# Patient Record
Sex: MALE | Race: OTHER
[De-identification: names, ages, dates, MRNs, and addresses within clinical notes are randomized per-mention and may not be internally consistent; named-entity substitution may affect disease eponyms.]

---

## 2020-03-23 ENCOUNTER — HOSPITAL ENCOUNTER (EMERGENCY)
Dept: HOSPITAL 72 - EMR | Age: 55
Discharge: ANOTHER HOSPITAL | End: 2020-03-23
Payer: COMMERCIAL

## 2020-03-23 VITALS — HEIGHT: 65 IN | BODY MASS INDEX: 29.99 KG/M2 | WEIGHT: 180 LBS

## 2020-03-23 VITALS — SYSTOLIC BLOOD PRESSURE: 161 MMHG | DIASTOLIC BLOOD PRESSURE: 109 MMHG

## 2020-03-23 VITALS — DIASTOLIC BLOOD PRESSURE: 104 MMHG | SYSTOLIC BLOOD PRESSURE: 165 MMHG

## 2020-03-23 VITALS — DIASTOLIC BLOOD PRESSURE: 116 MMHG | SYSTOLIC BLOOD PRESSURE: 209 MMHG

## 2020-03-23 DIAGNOSIS — R07.9: ICD-10-CM

## 2020-03-23 DIAGNOSIS — I10: ICD-10-CM

## 2020-03-23 DIAGNOSIS — R00.0: ICD-10-CM

## 2020-03-23 DIAGNOSIS — I51.7: ICD-10-CM

## 2020-03-23 DIAGNOSIS — I21.3: Primary | ICD-10-CM

## 2020-03-23 LAB
ADD MANUAL DIFF: NO
ALBUMIN SERPL-MCNC: 4.4 G/DL (ref 3.4–5)
ALBUMIN/GLOB SERPL: 1.1 {RATIO} (ref 1–2.7)
ALP SERPL-CCNC: 101 U/L (ref 46–116)
ALT SERPL-CCNC: 52 U/L (ref 12–78)
ANION GAP SERPL CALC-SCNC: 15 MMOL/L (ref 5–15)
AST SERPL-CCNC: 40 U/L (ref 15–37)
BASOPHILS NFR BLD AUTO: 1 % (ref 0–2)
BILIRUB SERPL-MCNC: 0.4 MG/DL (ref 0.2–1)
BUN SERPL-MCNC: 8 MG/DL (ref 7–18)
CALCIUM SERPL-MCNC: 10 MG/DL (ref 8.5–10.1)
CHLORIDE SERPL-SCNC: 98 MMOL/L (ref 98–107)
CO2 SERPL-SCNC: 25 MMOL/L (ref 21–32)
CREAT SERPL-MCNC: 1 MG/DL (ref 0.55–1.3)
EOSINOPHIL NFR BLD AUTO: 0.1 % (ref 0–3)
ERYTHROCYTE [DISTWIDTH] IN BLOOD BY AUTOMATED COUNT: 11.1 % (ref 11.6–14.8)
GLOBULIN SER-MCNC: 4 G/DL
HCT VFR BLD CALC: 48.6 % (ref 42–52)
HGB BLD-MCNC: 17.4 G/DL (ref 14.2–18)
LYMPHOCYTES NFR BLD AUTO: 15.1 % (ref 20–45)
MCV RBC AUTO: 83 FL (ref 80–99)
MONOCYTES NFR BLD AUTO: 5.4 % (ref 1–10)
NEUTROPHILS NFR BLD AUTO: 78.4 % (ref 45–75)
PLATELET # BLD: 327 K/UL (ref 150–450)
POTASSIUM SERPL-SCNC: 3.5 MMOL/L (ref 3.5–5.1)
RBC # BLD AUTO: 5.84 M/UL (ref 4.7–6.1)
SODIUM SERPL-SCNC: 137 MMOL/L (ref 136–145)
WBC # BLD AUTO: 15 K/UL (ref 4.8–10.8)

## 2020-03-23 PROCEDURE — 85025 COMPLETE CBC W/AUTO DIFF WBC: CPT

## 2020-03-23 PROCEDURE — 71045 X-RAY EXAM CHEST 1 VIEW: CPT

## 2020-03-23 PROCEDURE — 83735 ASSAY OF MAGNESIUM: CPT

## 2020-03-23 PROCEDURE — 99284 EMERGENCY DEPT VISIT MOD MDM: CPT

## 2020-03-23 PROCEDURE — 36415 COLL VENOUS BLD VENIPUNCTURE: CPT

## 2020-03-23 PROCEDURE — 80307 DRUG TEST PRSMV CHEM ANLYZR: CPT

## 2020-03-23 PROCEDURE — 83880 ASSAY OF NATRIURETIC PEPTIDE: CPT

## 2020-03-23 PROCEDURE — 96375 TX/PRO/DX INJ NEW DRUG ADDON: CPT

## 2020-03-23 PROCEDURE — 84484 ASSAY OF TROPONIN QUANT: CPT

## 2020-03-23 PROCEDURE — 80053 COMPREHEN METABOLIC PANEL: CPT

## 2020-03-23 PROCEDURE — 93005 ELECTROCARDIOGRAM TRACING: CPT

## 2020-03-23 PROCEDURE — 96374 THER/PROPH/DIAG INJ IV PUSH: CPT

## 2020-03-23 NOTE — EMERGENCY ROOM REPORT
History of Present Illness


General


Chief Complaint:  Hypertension


Source:  Patient





Present Illness


HPI


Patient is a 54-year-old male who presents after increased left-sided chest 

pain onset last night.  He reports having no prior smoking history.  Denies any 

cough.  He had not been having any fever.  Reports having constant pain to the 

left side of his chest.  Does not radiate.  He denies any prior history of 

smoking.  Denies hypertension.  He did not take medications regularly.  

Occasionally drinks alcohol at parties but denies any regular alcohol use.


COVID-19 risk:Contact w/high r:  No


COVID-19 risk:Travel to affect:  No


Has patient experienced corona:  No


Allergies:  


Coded Allergies:  


     No Known Allergies (Unverified , 3/23/20)





Patient History


Past Medical History:  see triage record


Reviewed Nursing Documentation:  PMH: Agreed; PSxH: Agreed





Nursing Documentation-PMH


Past Medical History:  No Stated History





Review of Systems


All Other Systems:  negative except mentioned in HPI





Physical Exam





Vital Signs








  Date Time  Temp Pulse Resp B/P (MAP) Pulse Ox O2 Delivery O2 Flow Rate FiO2


 


3/23/20 05:59 98.4 114 18 209/116 (147) 95 Room Air  








Sp02 EP Interpretation:  reviewed, normal


General Appearance:  normal inspection, well appearing, no apparent distress, 

alert, GCS 15


Head:  atraumatic


ENT:  normal ENT inspection, hearing grossly normal, normal voice


Neck:  normal inspection, full range of motion, supple, no bony tend


Respiratory:  normal inspection, lungs clear, normal breath sounds, no 

respiratory distress, no retraction, no wheezing


Cardiovascular #1:  no edema, tachycardia


Gastrointestinal:  normal inspection, normal bowel sounds, non tender, soft, no 

guarding, no hernia


Genitourinary:  no CVA tenderness


Musculoskeletal:  normal inspection, back normal, normal range of motion


Neurologic:  alert, motor strength/tone normal, CNs III-XII nml as tested, 

responsive, speech normal, normal inspection


Psychiatric:  normal inspection, judgement/insight normal, mood/affect normal





Medical Decision Making


Diagnostic Impression:  


 Primary Impression:  


 Chest pain


 Additional Impressions:  


 Uncontrolled hypertension


 STEMI (ST elevation myocardial infarction)


ER Course


Patient presented for chest pain.  Differential diagnosis included but was not 

limited to acute coronary syndrome, coronavirus, hypertensive crisis, pulmonary 

embolism, pneumonia, aortic dissection, shingles, pneumothorax, aortic 

dissection, esophageal rupture, pericarditis. 





EKG showed sinus tachycardia with a rate of 110 With acute ST changes in the 

anterior leads.  EKG was sent to [facility].  


CXR showed cardiomegaly with normal mediastinum with some pulmonary edema.


Patient was given aspirin as well as nitroglycerin.  He was given IV heparin 

bolus and none was contacted.  


Patient was discussed with Dr. Huffman  who accepted patient as transfer to [facility]. 

Patient was given labetalol due to rapid heartbeat and markedly elevated blood 

pressure.


EKG Diagnostic Results


Rate:  tachycardiac





Last Vital Signs








  Date Time  Temp Pulse Resp B/P (MAP) Pulse Ox O2 Delivery O2 Flow Rate FiO2


 


3/23/20 06:38    209/116    


 


3/23/20 06:05 98.4 114 18  96 Room Air  








Status:  improved


Disposition:  ER [facility]


Condition:  Stable


Referrals:  


[facility],REFERRING (PCP)











Eliazar Myers MD Mar 23, 2020 06:50

## 2020-03-23 NOTE — NUR
ED Nurse Note:



Pt walked into ED from home for c/o intermittent chest pain onset a couple 
months ago, but became worse yesterday. Pt states pain is pressure like in 
nature and radiates to L arm. Pt has elevated BP upon triage at 209/116. Pt 
denies headache, n/v or blurry vision. Pt is aaox4, breathing is normal and 
unlabored. Pt connected to cardiac monitor. EKG bedside.

## 2020-03-23 NOTE — NUR
ED Nurse Note:



Pt being [facility]. Pt is stable for transport [facility] per [facility]. Pt is in no acute distress at this time and denies any symptoms 
other than the presenting chest pain. Pt is aaox4, breathing is normal and 
unlabored. Pt HR in 90's and BP has decreased since triage. Pt is able to 
ambulate with steady gait to [facility]. Pt placed on [facility] cardiac monitor. Report 
[facility]. Pt belongings sent with pt.

## 2020-03-23 NOTE — NUR
ED Nurse Note:



Report given to DENISE Massey at [facility].